# Patient Record
Sex: FEMALE
[De-identification: names, ages, dates, MRNs, and addresses within clinical notes are randomized per-mention and may not be internally consistent; named-entity substitution may affect disease eponyms.]

---

## 2019-03-13 PROBLEM — Z00.00 ENCOUNTER FOR PREVENTIVE HEALTH EXAMINATION: Status: ACTIVE | Noted: 2019-03-13

## 2019-07-23 ENCOUNTER — RESULT REVIEW (OUTPATIENT)
Age: 69
End: 2019-07-23

## 2020-08-11 ENCOUNTER — RESULT REVIEW (OUTPATIENT)
Age: 70
End: 2020-08-11

## 2021-11-30 ENCOUNTER — RESULT REVIEW (OUTPATIENT)
Age: 71
End: 2021-11-30

## 2022-12-02 ENCOUNTER — RESULT REVIEW (OUTPATIENT)
Age: 72
End: 2022-12-02

## 2024-09-16 ENCOUNTER — EMERGENCY (EMERGENCY)
Facility: HOSPITAL | Age: 74
LOS: 1 days | Discharge: HOME | End: 2024-09-16
Attending: EMERGENCY MEDICINE
Payer: MEDICARE

## 2024-09-16 VITALS
HEART RATE: 84 BPM | RESPIRATION RATE: 18 BRPM | WEIGHT: 117.95 LBS | TEMPERATURE: 98 F | OXYGEN SATURATION: 98 % | DIASTOLIC BLOOD PRESSURE: 90 MMHG | SYSTOLIC BLOOD PRESSURE: 161 MMHG | HEIGHT: 61 IN

## 2024-09-16 VITALS
DIASTOLIC BLOOD PRESSURE: 97 MMHG | HEART RATE: 80 BPM | TEMPERATURE: 98 F | SYSTOLIC BLOOD PRESSURE: 148 MMHG | RESPIRATION RATE: 20 BRPM | OXYGEN SATURATION: 99 %

## 2024-09-16 LAB
ALBUMIN SERPL ELPH-MCNC: 4.8 G/DL — SIGNIFICANT CHANGE UP (ref 3.3–5)
ALP SERPL-CCNC: 73 U/L — SIGNIFICANT CHANGE UP (ref 40–120)
ALT FLD-CCNC: 15 U/L — SIGNIFICANT CHANGE UP (ref 10–45)
ANION GAP SERPL CALC-SCNC: 13 MMOL/L — SIGNIFICANT CHANGE UP (ref 5–17)
APTT BLD: 30.4 SEC — SIGNIFICANT CHANGE UP (ref 24.5–35.6)
AST SERPL-CCNC: 19 U/L — SIGNIFICANT CHANGE UP (ref 10–40)
BASOPHILS # BLD AUTO: 0.04 K/UL — SIGNIFICANT CHANGE UP (ref 0–0.2)
BASOPHILS NFR BLD AUTO: 0.5 % — SIGNIFICANT CHANGE UP (ref 0–2)
BILIRUB SERPL-MCNC: 0.4 MG/DL — SIGNIFICANT CHANGE UP (ref 0.2–1.2)
BUN SERPL-MCNC: 7 MG/DL — SIGNIFICANT CHANGE UP (ref 7–23)
CALCIUM SERPL-MCNC: 9.6 MG/DL — SIGNIFICANT CHANGE UP (ref 8.4–10.5)
CHLORIDE SERPL-SCNC: 93 MMOL/L — LOW (ref 96–108)
CO2 SERPL-SCNC: 23 MMOL/L — SIGNIFICANT CHANGE UP (ref 22–31)
CREAT SERPL-MCNC: 0.56 MG/DL — SIGNIFICANT CHANGE UP (ref 0.5–1.3)
EGFR: 98 ML/MIN/1.73M2 — SIGNIFICANT CHANGE UP
EOSINOPHIL # BLD AUTO: 0.03 K/UL — SIGNIFICANT CHANGE UP (ref 0–0.5)
EOSINOPHIL NFR BLD AUTO: 0.4 % — SIGNIFICANT CHANGE UP (ref 0–6)
GLUCOSE SERPL-MCNC: 113 MG/DL — HIGH (ref 70–99)
HCT VFR BLD CALC: 41.9 % — SIGNIFICANT CHANGE UP (ref 34.5–45)
HGB BLD-MCNC: 14.1 G/DL — SIGNIFICANT CHANGE UP (ref 11.5–15.5)
IMM GRANULOCYTES NFR BLD AUTO: 0.3 % — SIGNIFICANT CHANGE UP (ref 0–0.9)
INR BLD: 1.13 RATIO — SIGNIFICANT CHANGE UP (ref 0.85–1.18)
LYMPHOCYTES # BLD AUTO: 1.33 K/UL — SIGNIFICANT CHANGE UP (ref 1–3.3)
LYMPHOCYTES # BLD AUTO: 16.8 % — SIGNIFICANT CHANGE UP (ref 13–44)
MAGNESIUM SERPL-MCNC: 2.3 MG/DL — SIGNIFICANT CHANGE UP (ref 1.6–2.6)
MCHC RBC-ENTMCNC: 27.8 PG — SIGNIFICANT CHANGE UP (ref 27–34)
MCHC RBC-ENTMCNC: 33.7 GM/DL — SIGNIFICANT CHANGE UP (ref 32–36)
MCV RBC AUTO: 82.5 FL — SIGNIFICANT CHANGE UP (ref 80–100)
MONOCYTES # BLD AUTO: 0.38 K/UL — SIGNIFICANT CHANGE UP (ref 0–0.9)
MONOCYTES NFR BLD AUTO: 4.8 % — SIGNIFICANT CHANGE UP (ref 2–14)
NEUTROPHILS # BLD AUTO: 6.1 K/UL — SIGNIFICANT CHANGE UP (ref 1.8–7.4)
NEUTROPHILS NFR BLD AUTO: 77.2 % — HIGH (ref 43–77)
NRBC # BLD: 0 /100 WBCS — SIGNIFICANT CHANGE UP (ref 0–0)
NT-PROBNP SERPL-SCNC: <36 PG/ML — SIGNIFICANT CHANGE UP (ref 0–300)
PLATELET # BLD AUTO: 365 K/UL — SIGNIFICANT CHANGE UP (ref 150–400)
POTASSIUM SERPL-MCNC: 4.3 MMOL/L — SIGNIFICANT CHANGE UP (ref 3.5–5.3)
POTASSIUM SERPL-SCNC: 4.3 MMOL/L — SIGNIFICANT CHANGE UP (ref 3.5–5.3)
PROT SERPL-MCNC: 8.1 G/DL — SIGNIFICANT CHANGE UP (ref 6–8.3)
PROTHROM AB SERPL-ACNC: 11.8 SEC — SIGNIFICANT CHANGE UP (ref 9.5–13)
RBC # BLD: 5.08 M/UL — SIGNIFICANT CHANGE UP (ref 3.8–5.2)
RBC # FLD: 11.9 % — SIGNIFICANT CHANGE UP (ref 10.3–14.5)
SODIUM SERPL-SCNC: 129 MMOL/L — LOW (ref 135–145)
TROPONIN T, HIGH SENSITIVITY RESULT: <6 NG/L — SIGNIFICANT CHANGE UP (ref 0–51)
TSH SERPL-MCNC: 0.67 UIU/ML — SIGNIFICANT CHANGE UP (ref 0.27–4.2)
WBC # BLD: 7.9 K/UL — SIGNIFICANT CHANGE UP (ref 3.8–10.5)
WBC # FLD AUTO: 7.9 K/UL — SIGNIFICANT CHANGE UP (ref 3.8–10.5)

## 2024-09-16 PROCEDURE — 83735 ASSAY OF MAGNESIUM: CPT

## 2024-09-16 PROCEDURE — 36000 PLACE NEEDLE IN VEIN: CPT

## 2024-09-16 PROCEDURE — 85730 THROMBOPLASTIN TIME PARTIAL: CPT

## 2024-09-16 PROCEDURE — 71046 X-RAY EXAM CHEST 2 VIEWS: CPT | Mod: 26

## 2024-09-16 PROCEDURE — 71046 X-RAY EXAM CHEST 2 VIEWS: CPT

## 2024-09-16 PROCEDURE — 99285 EMERGENCY DEPT VISIT HI MDM: CPT | Mod: 25

## 2024-09-16 PROCEDURE — 99285 EMERGENCY DEPT VISIT HI MDM: CPT | Mod: GC

## 2024-09-16 PROCEDURE — 85610 PROTHROMBIN TIME: CPT

## 2024-09-16 PROCEDURE — 84443 ASSAY THYROID STIM HORMONE: CPT

## 2024-09-16 PROCEDURE — 80053 COMPREHEN METABOLIC PANEL: CPT

## 2024-09-16 PROCEDURE — 93005 ELECTROCARDIOGRAM TRACING: CPT

## 2024-09-16 PROCEDURE — 85025 COMPLETE CBC W/AUTO DIFF WBC: CPT

## 2024-09-16 PROCEDURE — 84484 ASSAY OF TROPONIN QUANT: CPT

## 2024-09-16 PROCEDURE — 83880 ASSAY OF NATRIURETIC PEPTIDE: CPT

## 2024-09-16 NOTE — ED ADULT TRIAGE NOTE - CHIEF COMPLAINT QUOTE
----- Message from Alix Lopez sent at 3/24/2020  3:55 PM CDT -----  Regarding: APPT  Patient wants to get an appt with Dr Brown he is experiencing bloating , diarrhea and abdominal pain please see if we can call him to see if we can see him in the office [phone] thank you      Chest pain palpitations

## 2024-09-16 NOTE — ED ADULT NURSE NOTE - NSFALLUNIVINTERV_ED_ALL_ED
Bed/Stretcher in lowest position, wheels locked, appropriate side rails in place/Call bell, personal items and telephone in reach/Instruct patient to call for assistance before getting out of bed/chair/stretcher/Non-slip footwear applied when patient is off stretcher/[place] to call system/Physically safe environment - no spills, clutter or unnecessary equipment/Purposeful proactive rounding/Room/bathroom lighting operational, light cord in reach

## 2024-09-16 NOTE — ED ADULT NURSE NOTE - OBJECTIVE STATEMENT
Patient is a 73y female presenting to the ED with c/o chest pain. Patient A&Ox4. Patient is speaking coherently in full sentences. PMH of HTN, HLD. Reports having mid-sternal chest pain/chest pressure with palpitations x 3-4 days; states the pain is non-radiating and is accompanied by shortness of breath. ECG performed; patient placed on portable cardiac monitor. Respirations spontaneous, even, and non-labored. Abdomen soft, non-distended and non-tender upon palpation. Denies headache, N/V/D, abdominal pain, fever/chills, burning upon urination or difficulty urinating.

## 2024-09-16 NOTE — ED PROVIDER NOTE - PHYSICAL EXAMINATION
Physical Exam:  Gen: NAD, non-toxic appearing  Head: NCAT  HEENT: Normal conjunctiva, trachea midline, moist mucous membranes  Lung: CTAB, no respiratory distress, no wheezes/rhonchi/rales B/L, speaking in full sentences  CV: RRR, no murmurs, rubs or gallops  Abd: Soft, NT, ND, no guarding, rigidity, rebound tenderness  MSK: No visible deformities, moves all four extremities   Neuro: No focal motor deficits  Skin: Warm, well perfused, no visible rashes, trace leg swelling  Psych: appropriate affect and mood

## 2024-09-16 NOTE — CONSULT NOTE ADULT - SUBJECTIVE AND OBJECTIVE BOX
DATE OF SERVICE: 09-16-24 @ 16:47    CHIEF COMPLAINT:Patient is a 73y old  Female who presents with a chief complaint of     HISTORY OF PRESENT ILLNESS:HPI:      PAST MEDICAL & SURGICAL HISTORY:  Hypertension      Hyperlipidemia              MEDICATIONS:                  FAMILY HISTORY:      Non-contributory    SOCIAL HISTORY:    [ ] Tobacco  [ ] Drugs  [ ] Alcohol    Allergies    No Known Allergies    Intolerances    	    REVIEW OF SYSTEMS:  CONSTITUTIONAL: No fever  EYES: No eye pain, visual disturbances, or discharge  ENMT:  No difficulty hearing, tinnitus  NECK: No pain or stiffness  RESPIRATORY: No cough, wheezing,  CARDIOVASCULAR: No chest pain, palpitations, passing out, dizziness, or leg swelling  GASTROINTESTINAL:  No nausea, vomiting, diarrhea or constipation. No melena.  GENITOURINARY: No dysuria, hematuria  NEUROLOGICAL: No stroke like symptoms  SKIN: No burning or lesions   ENDOCRINE: No heat or cold intolerance  MUSCULOSKELETAL: No joint pain or swelling  PSYCHIATRIC: No  anxiety, mood swings  HEME/LYMPH: No bleeding gums  ALLERGY AND IMMUNOLOGIC: No hives or eczema	    All other ROS negative    PHYSICAL EXAM:  T(C): 36.8 (09-16-24 @ 11:58), Max: 36.8 (09-16-24 @ 11:58)  HR: 84 (09-16-24 @ 11:58) (84 - 84)  BP: 161/90 (09-16-24 @ 11:58) (161/90 - 161/90)  RR: 18 (09-16-24 @ 11:58) (18 - 18)  SpO2: 98% (09-16-24 @ 11:58) (98% - 98%)  Wt(kg): --  I&O's Summary      Appearance: Normal	  HEENT:   Normal oral mucosa, EOMI	  Cardiovascular:  S1 S2, No JVD,    Respiratory: Lungs clear to auscultation	  Psychiatry: Alert  Gastrointestinal:  Soft, Non-tender, + BS	  Skin: No rashes   Neurologic: Non-focal  Extremities:  No edema  Vascular: Peripheral pulses palpable    	    	  	  CARDIAC MARKERS:  Labs personally reviewed by me                                  14.1   7.90  )-----------( 365      ( 16 Sep 2024 13:58 )             41.9     09-16    129<L>  |  93<L>  |  7   ----------------------------<  113<H>  4.3   |  23  |  0.56    Ca    9.6      16 Sep 2024 13:58  Mg     2.3     09-16    TPro  8.1  /  Alb  4.8  /  TBili  0.4  /  DBili  x   /  AST  19  /  ALT  15  /  AlkPhos  73  09-16          EKG: Personally reviewed by me -   Radiology: Personally reviewed by me -       Assessment /Plan:       Differential diagnosis and plan of care discussed with patient after the evaluation. Counseling on diet, nutritional counseling, weight management, exercise and medication compliance was done.   Advanced care planning/advanced directives discussed with patient/family. DNR status including forceful chest compressions to attempt to restart the heart, ventilator support/artificial breathing, electric shock, artificial nutrition, health care proxy, Molst form all discussed with pt. Pt wishes to consider. More than fifteen minutes spent on discussing advanced directives.     NAILA Braun DO [facility]  Cardiovascular Medicine  [address] [place] [address] Dr, [address]  Available for call or text via [employer] TEAMs  Office [phone]   DATE OF SERVICE: 09-16-24 @ 16:47    CHIEF COMPLAINT:Patient is a 73y old  Female who presents with a chief complaint of     HISTORY OF PRESENT ILLNESS:HPI:      PAST MEDICAL & SURGICAL HISTORY:  Hypertension      Hyperlipidemia              MEDICATIONS:                  FAMILY HISTORY:      Non-contributory    SOCIAL HISTORY:    [ ] Tobacco  [ ] Drugs  [ ] Alcohol    Allergies    No Known Allergies    Intolerances    	    REVIEW OF SYSTEMS:  CONSTITUTIONAL: No fever  EYES: No eye pain, visual disturbances, or discharge  ENMT:  No difficulty hearing, tinnitus  NECK: No pain or stiffness  RESPIRATORY: No cough, wheezing,  CARDIOVASCULAR: No chest pain, palpitations, passing out, dizziness, or leg swelling  GASTROINTESTINAL:  No nausea, vomiting, diarrhea or constipation. No melena.  GENITOURINARY: No dysuria, hematuria  NEUROLOGICAL: No stroke like symptoms  SKIN: No burning or lesions   ENDOCRINE: No heat or cold intolerance  MUSCULOSKELETAL: No joint pain or swelling  PSYCHIATRIC: No  anxiety, mood swings  HEME/LYMPH: No bleeding gums  ALLERGY AND IMMUNOLOGIC: No hives or eczema	    All other ROS negative    PHYSICAL EXAM:  T(C): 36.8 (09-16-24 @ 11:58), Max: 36.8 (09-16-24 @ 11:58)  HR: 84 (09-16-24 @ 11:58) (84 - 84)  BP: 161/90 (09-16-24 @ 11:58) (161/90 - 161/90)  RR: 18 (09-16-24 @ 11:58) (18 - 18)  SpO2: 98% (09-16-24 @ 11:58) (98% - 98%)  Wt(kg): --  I&O's Summary      Appearance: Normal	  HEENT:   Normal oral mucosa, EOMI	  Cardiovascular:  S1 S2, No JVD,    Respiratory: Lungs clear to auscultation	  Psychiatry: Alert  Gastrointestinal:  Soft, Non-tender, + BS	  Skin: No rashes   Neurologic: Non-focal  Extremities:  No edema  Vascular: Peripheral pulses palpable    	    	  	  CARDIAC MARKERS:  Labs personally reviewed by me                                  14.1   7.90  )-----------( 365      ( 16 Sep 2024 13:58 )             41.9     09-16    129<L>  |  93<L>  |  7   ----------------------------<  113<H>  4.3   |  23  |  0.56    Ca    9.6      16 Sep 2024 13:58  Mg     2.3     09-16    TPro  8.1  /  Alb  4.8  /  TBili  0.4  /  DBili  x   /  AST  19  /  ALT  15  /  AlkPhos  73  09-16          EKG: Personally reviewed by me - SR HR 84  Radiology: Personally reviewed by me -   9/16/24  FINDINGS:  The heart size is normal.  The lungs are clear.  There is no pneumothorax or pleural effusion.  IMPRESSION:  Clear lungs.      Assessment /Plan:       Differential diagnosis and plan of care discussed with patient after the evaluation. Counseling on diet, nutritional counseling, weight management, exercise and medication compliance was done.   Advanced care planning/advanced directives discussed with patient/family. DNR status including forceful chest compressions to attempt to restart the heart, ventilator support/artificial breathing, electric shock, artificial nutrition, health care proxy, Molst form all discussed with pt. Pt wishes to consider. More than fifteen minutes spent on discussing advanced directives.     NAILA Braun DO [facility]  Cardiovascular Medicine  [address] [place] [address] Dr, [address]  Available for call or text via [employer] TEAMs  Office [phone]   DATE OF SERVICE: 09-16-24 @ 16:47    CHIEF COMPLAINT:Patient is a 73y old  Female who presents with a chief complaint of     HISTORY OF PRESENT ILLNESS:HPI: 72 y/o female phx htn, hld. Presenting with  3-4 days central chest pressure and palpitations, nonradiating pain, associated with sob. no aggravating or alleviating factors. no unilateral LE edema endorses many months of bilateral swelling, no recent travel, surgery, no hormones. no personal/fh clots      PAST MEDICAL & SURGICAL HISTORY:  Hypertension      Hyperlipidemia              MEDICATIONS:                  FAMILY HISTORY:      Non-contributory    SOCIAL HISTORY:    [x] Tobacco  [x] Drugs  [x] Alcohol    Allergies    No Known Allergies    Intolerances    	    REVIEW OF SYSTEMS:  CONSTITUTIONAL: No fever  EYES: No eye pain, visual disturbances, or discharge  ENMT:  No difficulty hearing, tinnitus  NECK: No pain or stiffness  RESPIRATORY: No cough, wheezing,  CARDIOVASCULAR: No chest pain, palpitations, passing out, dizziness, or leg swelling  GASTROINTESTINAL:  No nausea, vomiting, diarrhea or constipation. No melena.  GENITOURINARY: No dysuria, hematuria  NEUROLOGICAL: No stroke like symptoms  SKIN: No burning or lesions   ENDOCRINE: No heat or cold intolerance  MUSCULOSKELETAL: No joint pain or swelling  PSYCHIATRIC: No  anxiety, mood swings  HEME/LYMPH: No bleeding gums  ALLERGY AND IMMUNOLOGIC: No hives or eczema	    All other ROS negative    PHYSICAL EXAM:  T(C): 36.8 (09-16-24 @ 11:58), Max: 36.8 (09-16-24 @ 11:58)  HR: 84 (09-16-24 @ 11:58) (84 - 84)  BP: 161/90 (09-16-24 @ 11:58) (161/90 - 161/90)  RR: 18 (09-16-24 @ 11:58) (18 - 18)  SpO2: 98% (09-16-24 @ 11:58) (98% - 98%)  Wt(kg): --  I&O's Summary      Appearance: Normal	  HEENT:   Normal oral mucosa, EOMI	  Cardiovascular:  S1 S2, No JVD,    Respiratory: Lungs clear to auscultation	  Psychiatry: Alert  Gastrointestinal:  Soft, Non-tender, + BS	  Skin: No rashes   Neurologic: Non-focal  Extremities:  No edema  Vascular: Peripheral pulses palpable    	    	  	  CARDIAC MARKERS:  Labs personally reviewed by me                                  14.1   7.90  )-----------( 365      ( 16 Sep 2024 13:58 )             41.9     09-16    129<L>  |  93<L>  |  7   ----------------------------<  113<H>  4.3   |  23  |  0.56    Ca    9.6      16 Sep 2024 13:58  Mg     2.3     09-16    TPro  8.1  /  Alb  4.8  /  TBili  0.4  /  DBili  x   /  AST  19  /  ALT  15  /  AlkPhos  73  09-16          EKG: Personally reviewed by me - SR HR 84  Radiology: Personally reviewed by me -   9/16/24  FINDINGS:  The heart size is normal.  The lungs are clear.  There is no pneumothorax or pleural effusion.  IMPRESSION:  Clear lungs.      Assessment /Plan:    72 y/o female phx htn, hld. Presenting with  3-4 days central chest pressure and palpitations, nonradiating pain, associated with sob. no aggravating or alleviating factors. no unilateral LE edema endorses many months of bilateral swelling, no recent travel, surgery, no hormones. no personal/fh clots.    1. Problem/Plan  Problem: Chest pain   - Patient reports chest tightness associated with shortness of breath   - Trop negative x1  - EKG revealing SR HR 84  - Given persistent symptoms recommend CTA angio  - Recommend obtaining TTE    2. Problem/Plan  Problem: Hypertension   - Continue Lisinopril 10mg PO daily     3. Problem/Plan  Problem: HLD  - Patient denies being on medical therapy    Differential diagnosis and plan of care discussed with patient after the evaluation. Counseling on diet, nutritional counseling, weight management, exercise and medication compliance was done.   Advanced care planning/advanced directives discussed with patient/family. DNR status including forceful chest compressions to attempt to restart the heart, ventilator support/artificial breathing, electric shock, artificial nutrition, health care proxy, Molst form all discussed with pt. Pt wishes to consider. More than fifteen minutes spent on discussing advanced directives.     NAILA Braun, DO [facility]  Cardiovascular Medicine  [address] [place] [address] Dr, [address]  Available for call or text via [employer] TEAMs  Office [phone]   DATE OF SERVICE: 09-16-24 @ 16:47    CHIEF COMPLAINT: Patient is a 73y old  Female who presents with a chief complaint of     HISTORY OF PRESENT ILLNESS:HPI: 74 y/o female phx htn, hld. Presenting with  3-4 days central chest pressure and palpitations, nonradiating pain, associated with sob. no aggravating or alleviating factors. no unilateral LE edema endorses many months of bilateral swelling, no recent travel, surgery, no hormones. no personal/fh clots      PAST MEDICAL & SURGICAL HISTORY:  Hypertension      Hyperlipidemia        MEDICATIONS:        FAMILY HISTORY:      Non-contributory    SOCIAL HISTORY:    [x] Tobacco  [x] Drugs  [x] Alcohol    Allergies    No Known Allergies    Intolerances    	    REVIEW OF SYSTEMS:  CONSTITUTIONAL: No fever  EYES: No eye pain, visual disturbances, or discharge  ENMT:  No difficulty hearing, tinnitus  NECK: No pain or stiffness  RESPIRATORY: No cough, wheezing,  CARDIOVASCULAR: No chest pain, palpitations, passing out, dizziness, or leg swelling  GASTROINTESTINAL:  No nausea, vomiting, diarrhea or constipation. No melena.  GENITOURINARY: No dysuria, hematuria  NEUROLOGICAL: No stroke like symptoms  SKIN: No burning or lesions   ENDOCRINE: No heat or cold intolerance  MUSCULOSKELETAL: No joint pain or swelling  PSYCHIATRIC: No  anxiety, mood swings  HEME/LYMPH: No bleeding gums  ALLERGY AND IMMUNOLOGIC: No hives or eczema	    All other ROS negative    PHYSICAL EXAM:  T(C): 36.8 (09-16-24 @ 11:58), Max: 36.8 (09-16-24 @ 11:58)  HR: 84 (09-16-24 @ 11:58) (84 - 84)  BP: 161/90 (09-16-24 @ 11:58) (161/90 - 161/90)  RR: 18 (09-16-24 @ 11:58) (18 - 18)  SpO2: 98% (09-16-24 @ 11:58) (98% - 98%)  Wt(kg): --  I&O's Summary      Appearance: Normal	  HEENT:   Normal oral mucosa, EOMI	  Cardiovascular:  S1 S2, No JVD,    Respiratory: Lungs clear to auscultation	  Psychiatry: Alert  Gastrointestinal:  Soft, Non-tender, + BS	  Skin: No rashes   Neurologic: Non-focal  Extremities:  No edema  Vascular: Peripheral pulses palpable    	    	  	  CARDIAC MARKERS:  Labs personally reviewed by me                                  14.1   7.90  )-----------( 365      ( 16 Sep 2024 13:58 )             41.9     09-16    129<L>  |  93<L>  |  7   ----------------------------<  113<H>  4.3   |  23  |  0.56    Ca    9.6      16 Sep 2024 13:58  Mg     2.3     09-16    TPro  8.1  /  Alb  4.8  /  TBili  0.4  /  DBili  x   /  AST  19  /  ALT  15  /  AlkPhos  73  09-16          EKG: Personally reviewed by me - SR HR 84  Radiology: Personally reviewed by me - CXR IMPRESSION:  Clear lungs.      Assessment /Plan:    74 y/o female phx htn, hld. Presenting with  3-4 days central chest pressure and palpitations, nonradiating pain, associated with sob. no aggravating or alleviating factors. no unilateral LE edema endorses many months of bilateral swelling, no recent travel, surgery, no hormones. no personal/fh clots.    1. Problem/Plan  Problem: Chest pain   - Patient reports chest tightness associated with shortness of breath   - Trop negative x1  - EKG revealing SR HR 84  - Given persistent symptoms recommend CTA angio heart and cors  - Recommend obtaining TTE    2. Problem/Plan  Problem: Hypertension   - Continue Lisinopril 10mg PO daily     3. Problem/Plan  Problem: HLD  - Patient denies being on medical therapy              Differential diagnosis and plan of care discussed with patient after the evaluation. Counseling on diet, nutritional counseling, weight management, exercise and medication compliance was done.   Advanced care planning/advanced directives discussed with patient/family. DNR status including forceful chest compressions to attempt to restart the heart, ventilator support/artificial breathing, electric shock, artificial nutrition, health care proxy, Molst form all discussed with pt. Pt wishes to consider. More than fifteen minutes spent on discussing advanced directives.     NAILA Braun DO [facility]  Cardiovascular Medicine  [address] [place] [address] Dr, [address]  Available for call or text via [employer] TEAMs  Office [phone]

## 2024-09-16 NOTE — ED ADULT TRIAGE NOTE - TEMP AT ED ARRIVAL (C)
Sent patient the following email:    Bravo Rebollar!     I was just reaching out to touch base about your final clearance - the last clearance we need on file is your PCP letter of support.  Once received, Tina will be able to submit your case to insurance for final approval.    Please also sign up for [employer] - I sent you a request to do so via text message this morning.     If you have any further question, please do not hesitate to contact Tina directly.    Thanks,   Malissa Hernández, DIAMONDN, RN   Assistant Nurse Manager   Bariatric and Metabolic Surgery   [address] [place] [address]   P: [phone] F: [phone]  Available via Epic Secure Chat      
36.8

## 2024-09-16 NOTE — ED PROVIDER NOTE - ATTENDING CONTRIBUTION TO CARE
PMD Luis Armando [place]  74-year-old female past medical history hypertension, hyperlipidemia comes ER complaining of chest pain onset several days ago, pains are constant associated with belching and palpitations, anorexia without vomiting, fever chills, syncope.  Patient denies diabetes, CVA, CAD, travel, habits.  Cardiac risk factors including son age 51  from MI.  Parents lived well into the 90s.  Physical exam adult female awake alert GCS 15 speech fluent  HEENT normocephalic/atraumatic  Chest clear A&P.  CV no rubs gallops murmur.  Abdomen soft positive bowel sounds.  Neuro GCS 15 speech fluent moves all extremities.  Farhat Olsen MD, Facep
no

## 2024-09-16 NOTE — ED PROVIDER NOTE - PATIENT PORTAL LINK FT
You can access the FollowMyHealth Patient Portal offered by [facility] by registering at the following website: [email or web address]. By joining [employer]’s FollowMyHealth portal, you will also be able to view your health information using other applications (apps) compatible with our system.

## 2024-09-16 NOTE — ED PROVIDER NOTE - CLINICAL SUMMARY MEDICAL DECISION MAKING FREE TEXT BOX
74 y/o female phx htn hld  3-4 days central chest pressure and palpitations, nonradiating pain, associated with sob. Concern for ACS versus new onset CHF.  Plan for CBC, CMP, Trope x 2, chest x-ray.  Will likely be seen candidate for echo stress.

## 2024-09-16 NOTE — ED PROVIDER NOTE - NSFOLLOWUPINSTRUCTIONS_ED_ALL_ED_FT
You were evaluated in the Emergency Department today for chest pain. Your evaluation has shown no signs of medical conditions requiring emergent intervention at this time, however we recommend that you follow up with your primary care physician or your cardiologist as soon as possible for further testing as an outpatient.    Please schedule an appointment for follow up with your cardiologist as per your previously schedule appointment tomorrow.     Return to the Emergency Department if you experience worsening or uncontrolled chest pain, shortness of breath, light headedness, feeling faint, nausea, vomiting, or any other concerning symptoms.

## 2024-09-16 NOTE — ED PROVIDER NOTE - PROGRESS NOTE DETAILS
Discussed with patient's niece , Tomasa alejandra for cards cs  Farhat Olsen MD, Facep Endorsed To Dr SRUTHI Olsen MD, Facep Spoke to patient and daughter who are both at bedside, they indicated that they have cardiology follow-up in the morning.  Discussed normal results of labs and x-ray.  Patient and patient daughter feel comfortable following up with cardiology outpatient.  Also spoke with patient's niece , Indicated that she did not know that she has cardiology follow-up in the morning and, deferred recs to ED management.  Agreed that they feel comfortable with cardiology follow-up outpatient. Spoke to patient and daughter who are both at bedside, they indicated that they have cardiology follow-up in the morning.  Discussed normal results of labs and x-ray.  Patient and patient daughter feel comfortable following up with cardiology outpatient.  Also spoke with patient's niece , Indicated that she did not know that she has cardiology follow-up in the morning and, deferred recs to ED management.  Patient prefers to follow-up outpatient in the morning.  Yash Javed MD PGY-2

## 2024-09-16 NOTE — ED PROVIDER NOTE - OBJECTIVE STATEMENT
72 y/o female phx htn hld  3-4 days central chest pressure and palpitations, nonradiating pain, associated with sob. no aggravating or alleviating factors. no unilateral LE edema endorses many months of bilateral swelling, no recent travel, surgery, no hormones. no personal/fh clots.

## 2024-09-16 NOTE — ED PROVIDER NOTE - RAPID ASSESSMENT
72 y/o female phx htn hld  3-4 days central chest pressure and palpitations, nonradiating pain, associated with sob. no aggravating or alleviating factors 72 y/o female phx htn hld  3-4 days central chest pressure and palpitations, nonradiating pain, associated with sob. no aggravating or alleviating factors. no unilateral LE edema endorses many months of bilateral swelling, no recent travel, surgery, no hormones. no personal/fh clots

## 2024-12-18 ENCOUNTER — NON-APPOINTMENT (OUTPATIENT)
Age: 74
End: 2024-12-18

## 2024-12-18 DIAGNOSIS — Z98.890 OTHER SPECIFIED POSTPROCEDURAL STATES: ICD-10-CM

## 2024-12-18 DIAGNOSIS — Z92.89 PERSONAL HISTORY OF OTHER MEDICAL TREATMENT: ICD-10-CM

## 2024-12-19 ENCOUNTER — APPOINTMENT (OUTPATIENT)
Facility: CLINIC | Age: 74
End: 2024-12-19

## 2024-12-19 VITALS — SYSTOLIC BLOOD PRESSURE: 150 MMHG | DIASTOLIC BLOOD PRESSURE: 89 MMHG

## 2024-12-19 PROCEDURE — 99213 OFFICE O/P EST LOW 20 MIN: CPT

## 2025-05-05 ENCOUNTER — NON-APPOINTMENT (OUTPATIENT)
Age: 75
End: 2025-05-05

## 2025-05-05 DIAGNOSIS — Z80.3 FAMILY HISTORY OF MALIGNANT NEOPLASM OF BREAST: ICD-10-CM

## 2025-05-05 DIAGNOSIS — Z78.9 OTHER SPECIFIED HEALTH STATUS: ICD-10-CM

## 2025-06-19 ENCOUNTER — NON-APPOINTMENT (OUTPATIENT)
Age: 75
End: 2025-06-19

## 2025-06-19 ENCOUNTER — APPOINTMENT (OUTPATIENT)
Age: 75
End: 2025-06-19
Payer: MEDICARE

## 2025-06-19 PROCEDURE — 92012 INTRM OPH EXAM EST PATIENT: CPT

## 2025-06-19 PROCEDURE — 92250 FUNDUS PHOTOGRAPHY W/I&R: CPT

## 2025-06-19 PROCEDURE — 92002 INTRM OPH EXAM NEW PATIENT: CPT

## 2025-09-13 ENCOUNTER — NON-APPOINTMENT (OUTPATIENT)
Age: 75
End: 2025-09-13